# Patient Record
Sex: MALE | Race: WHITE | NOT HISPANIC OR LATINO | Employment: FULL TIME | URBAN - METROPOLITAN AREA
[De-identification: names, ages, dates, MRNs, and addresses within clinical notes are randomized per-mention and may not be internally consistent; named-entity substitution may affect disease eponyms.]

---

## 2018-01-26 ENCOUNTER — TRANSCRIBE ORDERS (OUTPATIENT)
Dept: ADMINISTRATIVE | Facility: HOSPITAL | Age: 28
End: 2018-01-26

## 2018-01-26 DIAGNOSIS — R94.5 NONSPECIFIC ABNORMAL RESULTS OF LIVER FUNCTION STUDY: Primary | ICD-10-CM

## 2018-01-31 ENCOUNTER — HOSPITAL ENCOUNTER (OUTPATIENT)
Dept: ULTRASOUND IMAGING | Facility: HOSPITAL | Age: 28
Discharge: HOME | End: 2018-01-31
Attending: SURGERY
Payer: COMMERCIAL

## 2018-01-31 DIAGNOSIS — R94.5 NONSPECIFIC ABNORMAL RESULTS OF LIVER FUNCTION STUDY: ICD-10-CM

## 2018-01-31 PROCEDURE — 76700 US EXAM ABDOM COMPLETE: CPT

## 2019-02-15 ENCOUNTER — HOSPITAL ENCOUNTER (EMERGENCY)
Facility: HOSPITAL | Age: 29
Discharge: HOME | End: 2019-02-15
Attending: EMERGENCY MEDICINE
Payer: COMMERCIAL

## 2019-02-15 VITALS
OXYGEN SATURATION: 95 % | DIASTOLIC BLOOD PRESSURE: 62 MMHG | SYSTOLIC BLOOD PRESSURE: 131 MMHG | WEIGHT: 315 LBS | HEART RATE: 107 BPM | TEMPERATURE: 102.4 F | RESPIRATION RATE: 20 BRPM

## 2019-02-15 DIAGNOSIS — M79.10 MYALGIA: ICD-10-CM

## 2019-02-15 DIAGNOSIS — R82.998 LEUKOCYTES IN URINE: ICD-10-CM

## 2019-02-15 DIAGNOSIS — J11.1 INFLUENZA-LIKE ILLNESS: Primary | ICD-10-CM

## 2019-02-15 LAB
BACTERIA UR QL AUTO: ABNORMAL /HPF
BILIRUB UR QL STRIP: NEGATIVE
CLARITY UR: ABNORMAL
COLOR UR: YELLOW
FLUAV AG SPEC QL IA: NEGATIVE
FLUBV AG SPEC QL IA: NEGATIVE
GLUCOSE UR STRIP-MCNC: NEGATIVE MG/DL
HGB UR QL STRIP.AUTO: ABNORMAL
KETONES UR STRIP-MCNC: NEGATIVE MG/DL
LEUKOCYTE ESTERASE UR QL STRIP: ABNORMAL
NITRITE UR QL STRIP: NEGATIVE
NON-SQ EPI CELLS URNS QL MICRO: ABNORMAL /HPF
PH UR STRIP.AUTO: 5.5 [PH] (ref 4.5–8)
PROT UR STRIP-MCNC: NEGATIVE MG/DL
RBC #/AREA URNS AUTO: ABNORMAL /HPF
SP GR UR STRIP.AUTO: 1.02 (ref 1–1.03)
UROBILINOGEN UR QL STRIP.AUTO: 0.2 E.U./DL
WBC #/AREA URNS AUTO: ABNORMAL /HPF

## 2019-02-15 PROCEDURE — 87086 URINE CULTURE/COLONY COUNT: CPT

## 2019-02-15 PROCEDURE — 99283 EMERGENCY DEPT VISIT LOW MDM: CPT

## 2019-02-15 PROCEDURE — 87591 N.GONORRHOEAE DNA AMP PROB: CPT | Performed by: EMERGENCY MEDICINE

## 2019-02-15 PROCEDURE — 81003 URINALYSIS AUTO W/O SCOPE: CPT

## 2019-02-15 PROCEDURE — 81001 URINALYSIS AUTO W/SCOPE: CPT

## 2019-02-15 PROCEDURE — 87491 CHLMYD TRACH DNA AMP PROBE: CPT | Performed by: EMERGENCY MEDICINE

## 2019-02-15 PROCEDURE — 87631 RESP VIRUS 3-5 TARGETS: CPT | Performed by: EMERGENCY MEDICINE

## 2019-02-15 RX ORDER — CIPROFLOXACIN 500 MG/1
500 TABLET, FILM COATED ORAL ONCE
Status: COMPLETED | OUTPATIENT
Start: 2019-02-15 | End: 2019-02-15

## 2019-02-15 RX ORDER — IBUPROFEN 400 MG/1
400 TABLET ORAL ONCE
Status: COMPLETED | OUTPATIENT
Start: 2019-02-15 | End: 2019-02-15

## 2019-02-15 RX ORDER — ACETAMINOPHEN 325 MG/1
325 TABLET ORAL ONCE
Status: COMPLETED | OUTPATIENT
Start: 2019-02-15 | End: 2019-02-15

## 2019-02-15 RX ORDER — AMLODIPINE BESYLATE AND ATORVASTATIN CALCIUM 10; 20 MG/1; MG/1
1 TABLET, FILM COATED ORAL DAILY
COMMUNITY

## 2019-02-15 RX ORDER — METOPROLOL SUCCINATE 25 MG/1
25 TABLET, EXTENDED RELEASE ORAL DAILY
COMMUNITY

## 2019-02-15 RX ORDER — ACETAMINOPHEN 325 MG/1
650 TABLET ORAL ONCE
Status: COMPLETED | OUTPATIENT
Start: 2019-02-15 | End: 2019-02-15

## 2019-02-15 RX ORDER — CIPROFLOXACIN 500 MG/1
500 TABLET, FILM COATED ORAL 2 TIMES DAILY
Qty: 14 TABLET | Refills: 0 | Status: SHIPPED | OUTPATIENT
Start: 2019-02-15 | End: 2019-02-22

## 2019-02-15 RX ADMIN — IBUPROFEN 400 MG: 400 TABLET ORAL at 17:23

## 2019-02-15 RX ADMIN — ACETAMINOPHEN 650 MG: 325 TABLET ORAL at 17:08

## 2019-02-15 RX ADMIN — IBUPROFEN 400 MG: 400 TABLET ORAL at 17:08

## 2019-02-15 RX ADMIN — CIPROFLOXACIN HYDROCHLORIDE 500 MG: 500 TABLET, FILM COATED ORAL at 19:31

## 2019-02-15 RX ADMIN — ACETAMINOPHEN 325 MG: 325 TABLET ORAL at 17:23

## 2019-02-15 NOTE — ED PROVIDER NOTES
History  Chief Complaint   Patient presents with    Flu Symptoms     C/o muscle aches, fever, weakness, decreased, chills appetite starting Tuesday  Got flu shot this year  Patient is a 69-year-old male who presents to the emergency department having felt increasingly poorly over the last few days  He relates that on Monday February 10th he felt fine    He relates that on Tuesday he felt fine until 1900 when he went to shower  He stood up and suddenly noted that he was shivering feeling chilled  He relates that this is very unusual for him  He did take ibuprofen and slept poorly overnight  He relates that on Wednesday he developed some pain in the back of the head  He relates that it was not bad    This was reasonably well controlled with ibuprofen and he rested for the day  He relates that he was able to go to work on Thursday and took ibuprofen multiple times  Today he noted decreased appetite, slight increase in the pain at the base of the head and neck as well as intense myalgias this evening  Fever was 103 2 prior to coming into the ED  He denies having any significant nasal congestion or sore throat  He denies any problems with breathing  He has noted slight discomfort upon both defecation and urination with a slight amount of dysuria  Past medical history significant for asthma and hypertension  Three weeks ago he and the rest of his family members additionally had significant the episode of gastroenteritis  Last dose of ibuprofen was yesterday  Prior to Admission Medications   Prescriptions Last Dose Informant Patient Reported? Taking?    amLODIPine-atorvastatin (CADUET) 10-20 MG per tablet   Yes Yes   Sig: Take 1 tablet by mouth daily   metoprolol succinate (TOPROL-XL) 25 mg 24 hr tablet   Yes Yes   Sig: Take 25 mg by mouth daily      Facility-Administered Medications: None       Past Medical History:   Diagnosis Date    Asthma     Hypertension        Past Surgical History:   Procedure Laterality Date    CHOLECYSTECTOMY         History reviewed  No pertinent family history  I have reviewed and agree with the history as documented  Social History     Tobacco Use    Smoking status: Never Smoker    Smokeless tobacco: Never Used   Substance Use Topics    Alcohol use: Yes     Frequency: Never     Comment: social    Drug use: Never        Review of Systems   All other systems reviewed and are negative  Physical Exam  Physical Exam   Constitutional: He is oriented to person, place, and time  He appears well-developed and well-nourished  HENT:   Head: Normocephalic  Right Ear: Tympanic membrane and ear canal normal    Left Ear: Tympanic membrane and ear canal normal    Nose: Mucosal edema present  Mouth/Throat: Uvula is midline, oropharynx is clear and moist and mucous membranes are normal    Eyes: Conjunctivae and EOM are normal    Neck:   No meningismus  Mild tenderness at the base of the neck bilaterally  Cardiovascular: Regular rhythm  Tachycardic   Pulmonary/Chest: Effort normal and breath sounds normal    Abdominal: Soft  He exhibits no distension  There is no tenderness  Musculoskeletal: Normal range of motion  Lymphadenopathy:     He has no cervical adenopathy  Neurological: He is alert and oriented to person, place, and time  Skin: Skin is warm and dry  Psychiatric: He has a normal mood and affect  His behavior is normal    Nursing note and vitals reviewed        Vital Signs  ED Triage Vitals [02/15/19 1629]   Temperature Pulse Respirations Blood Pressure SpO2   (!) 102 4 °F (39 1 °C) (!) 120 18 147/73 100 %      Temp Source Heart Rate Source Patient Position - Orthostatic VS BP Location FiO2 (%)   Oral Monitor Sitting Left arm --      Pain Score       4           Vitals:    02/15/19 1629 02/15/19 1915   BP: 147/73 131/62   Pulse: (!) 120 (!) 107   Patient Position - Orthostatic VS: Sitting Lying       Visual Acuity      ED Medications  Medications   acetaminophen (TYLENOL) tablet 650 mg (650 mg Oral Given 2/15/19 1708)   ibuprofen (MOTRIN) tablet 400 mg (400 mg Oral Given 2/15/19 1708)   ibuprofen (MOTRIN) tablet 400 mg (400 mg Oral Given 2/15/19 1723)   acetaminophen (TYLENOL) tablet 325 mg (325 mg Oral Given 2/15/19 1723)   ciprofloxacin (CIPRO) tablet 500 mg (500 mg Oral Given 2/15/19 1931)       Diagnostic Studies  Results Reviewed     Procedure Component Value Units Date/Time    Urine Microscopic [[id number]]  (Abnormal) Collected:  02/15/19 1740    Lab Status:  Final result Specimen:  Urine, Clean Catch Updated:  02/15/19 1828     RBC, UA None Seen /hpf      WBC, UA Innumerable /hpf      Epithelial Cells Occasional /hpf      Bacteria, UA Occasional /hpf     Urine culture [[id number]] Collected:  02/15/19 1740    Lab Status: In process Specimen:  Urine, Clean Catch Updated:  02/15/19 1828    Rapid Influenza Screen with Reflex PCR [[id number]]  (Normal) Collected:  02/15/19 1725    Lab Status:  Final result Specimen:  Nasopharyngeal Swab Updated:  02/15/19 1823     Rapid Influenza A Ag Negative     Rapid Influenza B Ag Negative    INFLUENZA A/B AND RSV, PCR [[id number]] Collected:  02/15/19 1725    Lab Status: In process Specimen:  Nasopharyngeal Swab Updated:  02/15/19 1823    Chlamydia/GC amplified DNA by PCR [[id number]] Collected:  02/15/19 1740    Lab Status:   In process Specimen:  Urine, Other Updated:  02/15/19 1806    ED Urine Macroscopic [[id number]]  (Abnormal) Collected:  02/15/19 1740    Lab Status:  Final result Specimen:  Urine Updated:  02/15/19 1738     Color, UA Yellow     Clarity, UA Cloudy     pH, UA 5 5     Leukocytes, UA Moderate     Nitrite, UA Negative     Protein, UA Negative mg/dl      Glucose, UA Negative mg/dl      Ketones, UA Negative mg/dl      Urobilinogen, UA 0 2 E U /dl      Bilirubin, UA Negative     Blood, UA Small     Specific [place], UA 1 025    Narrative:       CLINITEK RESULT                 No orders to display              Procedures  Procedures       Phone Contacts  ED Phone Contact    ED Course  ED Course as of Feb 16 0030   Fri Feb 15, 2019   1727 Symptoms most consistent with influenza  Patient did have a flu vaccine this year  He is at the borderline time for considering use of Tamiflu  Given history of asthma he would otherwise be an appropriate candidate  Will check rapid influenza  Treating with a high-dose acetaminophen and NSAID for symptomatic relief  Additionally checking urine sample given slight dysuria though suspicion for UTI is less likely  0 Study results reviewed with patient  Rapid influenza testing is negative  Symptoms otherwise consistent with viral etiology  With regard to white blood cells in the urine explained that this was an unusual finding an otherwise healthy male  He is circumcised and denies having appreciated any rash or penile discharge  No history of UTIs  He has no concern for STI exposure  At this time treating as UTI  He is aware that the urine culture will return within the next couple of days  Work note will be provided  MDM    Disposition  Final diagnoses:   Influenza-like illness   Myalgia   Leukocytes in urine     Time reflects when diagnosis was documented in both MDM as applicable and the Disposition within this note     Time User Action Codes Description Comment    2/15/2019  7:14 PM Dayday Jovany A Add [R69] Influenza-like illness     2/15/2019  7:14 PM Dayday Jovany A Add [M79 10] Myalgia     2/15/2019  7:15 PM Dayday Jovany A Add [R82 998] Leukocytes in urine       ED Disposition     ED Disposition Condition Date/Time Comment    Discharge Stable Fri Feb 15, 2019  7:14 PM Juliana Lunggarima discharge to home/self care              Follow-up Information     Follow up With Specialties Details Why Contact Susy Choi DO Family Medicine Schedule an appointment as soon as possible for a visit   [address] [place], [place] [id number]  [phone]            Discharge Medication List as of 2/15/2019  7:18 PM      START taking these medications    Details   ciprofloxacin (CIPRO) 500 mg tablet Take 1 tablet (500 mg total) by mouth 2 (two) times a day for 7 days, Starting Fri 2/15/2019, Until Fri 2/22/2019, Print         CONTINUE these medications which have NOT CHANGED    Details   amLODIPine-atorvastatin (CADUET) 10-20 MG per tablet Take 1 tablet by mouth daily, Historical Med      metoprolol succinate (TOPROL-XL) 25 mg 24 hr tablet Take 25 mg by mouth daily, Historical Med           No discharge procedures on file      ED Provider  Electronically Signed by           Nik Lopez MD  02/16/19 2550

## 2019-02-16 LAB
BACTERIA UR CULT: NORMAL
FLUAV AG SPEC QL: NOT DETECTED
FLUBV AG SPEC QL: NOT DETECTED
RSV B RNA SPEC QL NAA+PROBE: NOT DETECTED

## 2019-02-16 NOTE — DISCHARGE INSTRUCTIONS
Take ciprofloxacin 500 mg orally twice daily for 7 days  Be sure to stay well hydrated  You may take acetaminophen 975 or 1000 mg every 6 hr as well as ibuprofen 800 mg every 6 hr as needed for discomfort  As discussed, urine culture has been sent  Youbaudilio receive a phone call if a change in antibiotic is needed  Return to the emergency department for any worsening/new concerns

## 2019-02-17 LAB
C TRACH DNA SPEC QL NAA+PROBE: NEGATIVE
N GONORRHOEA DNA SPEC QL NAA+PROBE: NEGATIVE

## 2021-04-08 DIAGNOSIS — Z23 ENCOUNTER FOR IMMUNIZATION: ICD-10-CM

## 2024-02-09 ENCOUNTER — APPOINTMENT (OUTPATIENT)
Dept: LAB | Facility: CLINIC | Age: 34
End: 2024-02-09
Payer: COMMERCIAL

## 2024-02-09 ENCOUNTER — APPOINTMENT (OUTPATIENT)
Dept: RADIOLOGY | Facility: CLINIC | Age: 34
End: 2024-02-09
Payer: COMMERCIAL

## 2024-02-09 ENCOUNTER — TRANSCRIBE ORDERS (OUTPATIENT)
Dept: URGENT CARE | Facility: CLINIC | Age: 34
End: 2024-02-09

## 2024-02-09 DIAGNOSIS — M79.639 PAIN IN FOREARM, UNSPECIFIED LATERALITY: ICD-10-CM

## 2024-02-09 DIAGNOSIS — M25.532 ARTHRALGIA OF LEFT FOREARM: Primary | ICD-10-CM

## 2024-02-09 DIAGNOSIS — M25.532 ARTHRALGIA OF LEFT FOREARM: ICD-10-CM

## 2024-02-09 DIAGNOSIS — M25.539 ARTHRALGIA OF FOREARM, UNSPECIFIED LATERALITY: ICD-10-CM

## 2024-02-09 LAB
ANA SER QL IA: NEGATIVE
ERYTHROCYTE [SEDIMENTATION RATE] IN BLOOD: 14 MM/HOUR (ref 0–14)
RHEUMATOID FACT SER QL LA: NEGATIVE
URATE SERPL-MCNC: 5.7 MG/DL (ref 3.5–8.5)

## 2024-02-09 PROCEDURE — 73110 X-RAY EXAM OF WRIST: CPT

## 2024-02-09 PROCEDURE — 73090 X-RAY EXAM OF FOREARM: CPT

## 2024-02-09 PROCEDURE — 86038 ANTINUCLEAR ANTIBODIES: CPT

## 2024-02-09 PROCEDURE — 86430 RHEUMATOID FACTOR TEST QUAL: CPT

## 2024-02-09 PROCEDURE — 85652 RBC SED RATE AUTOMATED: CPT

## 2024-02-09 PROCEDURE — 84550 ASSAY OF BLOOD/URIC ACID: CPT

## 2024-02-09 PROCEDURE — 36415 COLL VENOUS BLD VENIPUNCTURE: CPT

## 2024-04-17 ENCOUNTER — OCCMED (OUTPATIENT)
Dept: URGENT CARE | Facility: CLINIC | Age: 34
End: 2024-04-17

## 2024-04-17 DIAGNOSIS — S61.214A LACERATION WITHOUT FOREIGN BODY OF RIGHT RING FINGER WITHOUT DAMAGE TO NAIL, INITIAL ENCOUNTER: Primary | ICD-10-CM

## 2024-05-01 ENCOUNTER — OFFICE VISIT (OUTPATIENT)
Dept: URGENT CARE | Facility: CLINIC | Age: 34
End: 2024-05-01

## 2024-05-01 DIAGNOSIS — S61.314D: Primary | ICD-10-CM

## 2024-05-02 ENCOUNTER — HOSPITAL ENCOUNTER (OUTPATIENT)
Dept: RADIOLOGY | Facility: HOSPITAL | Age: 34
Discharge: HOME | End: 2024-05-02
Attending: STUDENT IN AN ORGANIZED HEALTH CARE EDUCATION/TRAINING PROGRAM
Payer: OTHER MISCELLANEOUS

## 2024-05-02 VITALS — HEIGHT: 73 IN | WEIGHT: 271.4 LBS | BODY MASS INDEX: 35.97 KG/M2

## 2024-05-02 DIAGNOSIS — M79.644 FINGER PAIN, RIGHT: ICD-10-CM
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
## 2024-05-02 PROCEDURE — 99204 OFFICE O/P NEW MOD 45 MIN: CPT | Performed by: STUDENT IN AN ORGANIZED HEALTH CARE EDUCATION/TRAINING PROGRAM

## 2024-05-02 PROCEDURE — 73140 X-RAY EXAM OF FINGER(S): CPT

## 2024-05-02 RX ORDER — MELOXICAM 7.5 MG/1
7.5 TABLET ORAL DAILY
Qty: 14 TABLET | Refills: 0 | Status: SHIPPED | OUTPATIENT
Start: 2024-05-02

## 2024-05-02 NOTE — PROGRESS NOTES
ORTHOPAEDIC HAND, WRIST, AND ELBOW OFFICE  VISIT      ASSESSMENT/PLAN:      Diagnoses and all orders for this visit:    Cat bite, initial encounter  -     meloxicam (Mobic) 7.5 mg tablet; Take 1 tablet (7.5 mg total) by mouth daily    Finger pain, right  -     XR finger right fourth digit-ring; Future    Other orders  -     metoprolol tartrate (LOPRESSOR) 25 mg tablet; Take 25 mg by mouth 2 (two) times a day          33 y.o. male with right ring finger cat bite    X-rays were reviewed which demonstrate no fractures or dislocations. On exam, tendons are intact. There is no erythema or signs of infection. He was instructed to wash the hands with soap and water. He will continue with light duty work and a updated note was provided for this today. A 2 week prescription was provided for Mobic. He will follow up in 4 weeks for repeat evaluation.    Follow Up:  4 weeks       To Do Next Visit:  Re-evaluation of current issue          Antwon Dickson MD  Attending, Orthopaedic Surgery  Hand, Wrist, and Elbow Surgery  [facility]    ______________________________________________________________________________________________    CHIEF COMPLAINT:  Chief Complaint   Patient presents with   • Right Ring Finger - Pain       SUBJECTIVE:  Patient is a 33 y.o. RHD male who presents today for evaluation and treatment of right ring finger cat bite. The patient states appx 2 weeks ago he was at work when a cat bit his right ring finger near the PIP joint. He presented to urgent care after the injury were the bite was washed out and steri-strips were applied. He was placed on Augmentin which he completed as prescribed. He notes some pain and tightness with finger flexion. He denies any drainage. He denies any fever or chills.     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 No pertinent family history.    SOCIAL HISTORY:  Social History     Tobacco Use   • Smoking status: Never   • Smokeless tobacco: Never   Substance Use Topics   • Alcohol use: Yes     Comment: social   • Drug use: Never       MEDICATIONS:    Current Outpatient Medications:   •  amLODIPine-atorvastatin (CADUET) 10-20 MG per tablet, Take 1 tablet by mouth daily, Disp: , Rfl:   •  meloxicam (Mobic) 7.5 mg tablet, Take 1 tablet (7.5 mg total) by mouth daily, Disp: 14 tablet, Rfl: 0  •  metoprolol succinate (TOPROL-XL) 25 mg 24 hr tablet, Take 25 mg by mouth daily, Disp: , Rfl:   •  metoprolol tartrate (LOPRESSOR) 25 mg tablet, Take 25 mg by mouth 2 (two) times a day, Disp: , Rfl:     ALLERGIES:  Allergies   Allergen Reactions   • Red Dye - Food Allergy Hives           REVIEW OF SYSTEMS:  Musculoskeletal:        As noted in HPI.   All other systems reviewed and are negative.    VITALS:  There were no vitals filed for this visit.    LABS:  HgA1c: No results found for: \"HGBA1C\"  BMP: No results found for: \"GLUCOSE\", \"CALCIUM\", \"NA\", \"K\", \"CO2\", \"CL\", \"BUN\", \"CREATININE\"    _____________________________________________________  PHYSICAL EXAMINATION:  General: Well developed and well nourished, alert & oriented x 3, appears comfortable  Psychiatric: Normal  HEENT: Normocephalic, Atraumatic Trachea Midline, No torticollis  Pulmonary: No audible wheezing or respiratory distress   Abdomen/GI: Non tender, non distended   Cardiovascular: No pitting edema, 2+ radial pulse   Skin: No Masses, No Fluctuation, No Ulcerations  Neurovascular: Sensation Intact to the Median, Ulnar, Radial Nerve, Motor Intact to the Median, Ulnar, Radial Nerve, and Pulses Intact  Musculoskeletal: Normal, except as noted in detailed exam and in " HPI.      MUSCULOSKELETAL EXAMINATION:  Right ring  Laceration 1 cm oblique at level of ring finger PIP joint  Pulp to palm 5 mm  5/5 PIP extension strength   - Tripp test  ___________________________________________________  STUDIES REVIEWED:  Xrays of the right ring finger were reviewed and independently interpreted in PACS by Dr. Dickson and demonstrate no fractures or dislocations.           PROCEDURES PERFORMED:  Procedures  No Procedures performed today    _____________________________________________________      Scribe Attestation    I,:  Tori Arceo MA am acting as a scribe while in the presence of the attending physician.:       I,:  Antwon Dickson MD personally performed the services described in this documentation    as scribed in my presence.:

## 2024-05-02 NOTE — LETTER
May 2, 2024     Patient: Hernán Balderas  YOB: 1990  Date of Visit: 5/2/2024      To Whom it May Concern:    Hernán Balderas is under my professional care. Hernán was seen in my office on 5/2/2024. Hernán may return to work on light duty no lifting greater than 2 lbs with his RUE.    If you have any questions or concerns, please don't hesitate to call.         Sincerely,          Antwon Dickson MD

## 2024-05-31 ENCOUNTER — OFFICE VISIT (OUTPATIENT)
Dept: OBGYN CLINIC | Facility: CLINIC | Age: 34
End: 2024-05-31
Payer: OTHER MISCELLANEOUS

## 2024-05-31 VITALS
HEIGHT: 73 IN | WEIGHT: 277 LBS | SYSTOLIC BLOOD PRESSURE: 154 MMHG | DIASTOLIC BLOOD PRESSURE: 98 MMHG | BODY MASS INDEX: 36.71 KG/M2
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
## 2024-05-31 PROCEDURE — 99213 OFFICE O/P EST LOW 20 MIN: CPT | Performed by: STUDENT IN AN ORGANIZED HEALTH CARE EDUCATION/TRAINING PROGRAM

## 2024-05-31 RX ORDER — AMLODIPINE BESYLATE AND BENAZEPRIL HYDROCHLORIDE 5; 10 MG/1; MG/1
1 CAPSULE ORAL DAILY
COMMUNITY
Start: 2024-04-27

## 2024-05-31 NOTE — PROGRESS NOTES
ORTHOPAEDIC HAND, WRIST, AND ELBOW OFFICE  VISIT      ASSESSMENT/PLAN:      Diagnoses and all orders for this visit:    Cat bite, subsequent encounter    Other orders  -     amLODIPine-benazepril (LOTREL 5-10) 5-10 MG per capsule; Take 1 capsule by mouth daily          34 y.o. male with right ring finger cat bite     The patient is doing well. The scar is well healed. The patient was instructed to work on scar massage. He has full range of motion on exam. He may return to work full duty and a note was provided for this today. He may follow up with me as needed.      Follow Up:  PRN       To Do Next Visit:             Antwon Dickson MD  Attending, Orthopaedic Surgery  Hand, Wrist, and Elbow Surgery  [facility]    ______________________________________________________________________________________________    CHIEF COMPLAINT:  Chief Complaint   Patient presents with   • Right Ring Finger - Follow-up     Feels tight        SUBJECTIVE:  Patient is a 34 y.o. RHD male who presents today for follow up of right ring finger cat bite.  The patient states he is doing well. He notes some tightness over the scar with flexion. He notes he can make a fist. He denies any pain.      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 No pertinent family history.    SOCIAL HISTORY:  Social History     Tobacco Use   • Smoking status: Never   • Smokeless tobacco: Never   Substance Use Topics   • Alcohol use: Yes     Comment: social   • Drug use: Never       MEDICATIONS:    Current Outpatient Medications:   •  amLODIPine-benazepril (LOTREL 5-10) 5-10 MG per capsule, Take 1 capsule  "by mouth daily, Disp: , Rfl:   •  meloxicam (Mobic) 7.5 mg tablet, Take 1 tablet (7.5 mg total) by mouth daily, Disp: 14 tablet, Rfl: 0  •  metoprolol tartrate (LOPRESSOR) 25 mg tablet, Take 25 mg by mouth 2 (two) times a day, Disp: , Rfl:   •  metoprolol succinate (TOPROL-XL) 25 mg 24 hr tablet, Take 25 mg by mouth daily (Patient not taking: Reported on 5/31/2024), Disp: , Rfl:     ALLERGIES:  Allergies   Allergen Reactions   • Red Dye - Food Allergy Hives           REVIEW OF SYSTEMS:  Musculoskeletal:        As noted in HPI.   All other systems reviewed and are negative.    VITALS:  Vitals:    05/31/24 1007   BP: 154/98       LABS:  HgA1c: No results found for: \"HGBA1C\"  BMP: No results found for: \"GLUCOSE\", \"CALCIUM\", \"NA\", \"K\", \"CO2\", \"CL\", \"BUN\", \"CREATININE\"    _____________________________________________________  PHYSICAL EXAMINATION:  General: Well developed and well nourished, alert & oriented x 3, appears comfortable  Psychiatric: Normal  HEENT: Normocephalic, Atraumatic Trachea Midline, No torticollis  Pulmonary: No audible wheezing or respiratory distress   Abdomen/GI: Non tender, non distended   Cardiovascular: No pitting edema, 2+ radial pulse   Skin: No masses, erythema, lacerations, fluctation, ulcerations  Neurovascular: Sensation Intact to the Median, Ulnar, Radial Nerve, Motor Intact to the Median, Ulnar, Radial Nerve, and Pulses Intact  Musculoskeletal: Normal, except as noted in detailed exam and in HPI.      MUSCULOSKELETAL EXAMINATION:  Right right finger  Scar well healed  Full digit extension  Full fist    ___________________________________________________  STUDIES REVIEWED:  Prior ESR from 2/9/24 reviewed  Prior right ring finger x-rays reviewed        PROCEDURES PERFORMED:  Procedures  No Procedures performed today    _____________________________________________________      Scribe Attestation    I,:  Tori Arceo MA am acting as a scribe while in the presence of the attending " physician.:       I,:  Antwon Dickson MD personally performed the services described in this documentation    as scribed in my presence.:

## 2024-05-31 NOTE — LETTER
May 31, 2024     Patient: Hernán Balderas  YOB: 1990  Date of Visit: 5/31/2024      To Whom it May Concern:    Hernán Balderas is under my professional care. Hernán was seen in my office on 5/31/2024. Hernán may return to work full duty with no restrictions.     If you have any questions or concerns, please don't hesitate to call.         Sincerely,          Antwon Dickson MD

## 2025-02-11 ENCOUNTER — HOSPITAL ENCOUNTER (EMERGENCY)
Facility: HOSPITAL | Age: 35
Discharge: HOME | End: 2025-02-11
Attending: EMERGENCY MEDICINE | Admitting: EMERGENCY MEDICINE
Payer: COMMERCIAL

## 2025-02-11 ENCOUNTER — APPOINTMENT (EMERGENCY)
Dept: CT IMAGING | Facility: HOSPITAL | Age: 35
End: 2025-02-11
Payer: COMMERCIAL

## 2025-02-11 VITALS
RESPIRATION RATE: 18 BRPM | OXYGEN SATURATION: 97 % | TEMPERATURE: 98.8 F | HEART RATE: 71 BPM | SYSTOLIC BLOOD PRESSURE: 164 MMHG | DIASTOLIC BLOOD PRESSURE: 93 MMHG

## 2025-02-11 DIAGNOSIS — R10.9 ABDOMINAL PAIN: Primary | ICD-10-CM

## 2025-02-11 LAB
ALBUMIN SERPL BCG-MCNC: 4.4 G/DL (ref 3.5–5)
ALP SERPL-CCNC: 106 U/L (ref 34–104)
ALT SERPL W P-5'-P-CCNC: 24 U/L (ref 7–52)
ANION GAP SERPL CALCULATED.3IONS-SCNC: 7 MMOL/L (ref 4–13)
AST SERPL W P-5'-P-CCNC: 22 U/L (ref 13–39)
BASOPHILS # BLD MANUAL: 0.49 THOUSAND/UL (ref 0–0.1)
BASOPHILS NFR MAR MANUAL: 3 % (ref 0–1)
BILIRUB SERPL-MCNC: 0.82 MG/DL (ref 0.2–1)
BILIRUB UR QL STRIP: NEGATIVE
BUN SERPL-MCNC: 15 MG/DL (ref 5–25)
CALCIUM SERPL-MCNC: 9.7 MG/DL (ref 8.4–10.2)
CHLORIDE SERPL-SCNC: 108 MMOL/L (ref 96–108)
CLARITY UR: CLEAR
CO2 SERPL-SCNC: 27 MMOL/L (ref 21–32)
COLOR UR: YELLOW
CREAT SERPL-MCNC: 0.8 MG/DL (ref 0.6–1.3)
EOSINOPHIL # BLD MANUAL: 3.41 THOUSAND/UL (ref 0–0.4)
EOSINOPHIL NFR BLD MANUAL: 21 % (ref 0–6)
ERYTHROCYTE [DISTWIDTH] IN BLOOD BY AUTOMATED COUNT: 12.7 % (ref 11.6–15.1)
GFR SERPL CREATININE-BSD FRML MDRD: 115 ML/MIN/1.73SQ M
GLUCOSE SERPL-MCNC: 105 MG/DL (ref 65–140)
GLUCOSE UR STRIP-MCNC: NEGATIVE MG/DL
HCT VFR BLD AUTO: 48 % (ref 36.5–49.3)
HGB BLD-MCNC: 15.9 G/DL (ref 12–17)
HGB UR QL STRIP.AUTO: NEGATIVE
KETONES UR STRIP-MCNC: NEGATIVE MG/DL
LEUKOCYTE ESTERASE UR QL STRIP: NEGATIVE
LG PLATELETS BLD QL SMEAR: PRESENT
LIPASE SERPL-CCNC: 43 U/L (ref 11–82)
LYMPHOCYTES # BLD AUTO: 21 % (ref 14–44)
LYMPHOCYTES # BLD AUTO: 3.89 THOUSAND/UL (ref 0.6–4.47)
MCH RBC QN AUTO: 29.2 PG (ref 26.8–34.3)
MCHC RBC AUTO-ENTMCNC: 33.1 G/DL (ref 31.4–37.4)
MCV RBC AUTO: 88 FL (ref 82–98)
MONOCYTES # BLD AUTO: 0.65 THOUSAND/UL (ref 0–1.22)
MONOCYTES NFR BLD: 4 % (ref 4–12)
NEUTROPHILS # BLD MANUAL: 7.79 THOUSAND/UL (ref 1.85–7.62)
NEUTS BAND NFR BLD MANUAL: 3 % (ref 0–8)
NEUTS SEG NFR BLD AUTO: 45 % (ref 43–75)
NITRITE UR QL STRIP: NEGATIVE
PH UR STRIP.AUTO: 5.5 [PH]
PLATELET # BLD AUTO: 315 THOUSANDS/UL (ref 149–390)
PLATELET BLD QL SMEAR: ADEQUATE
PMV BLD AUTO: 10.5 FL (ref 8.9–12.7)
POTASSIUM SERPL-SCNC: 3.9 MMOL/L (ref 3.5–5.3)
PROT SERPL-MCNC: 7.5 G/DL (ref 6.4–8.4)
PROT UR STRIP-MCNC: NEGATIVE MG/DL
RBC # BLD AUTO: 5.45 MILLION/UL (ref 3.88–5.62)
RBC MORPH BLD: PRESENT
SODIUM SERPL-SCNC: 142 MMOL/L (ref 135–147)
SP GR UR STRIP.AUTO: 1.02 (ref 1–1.03)
UROBILINOGEN UR STRIP-ACNC: <2 MG/DL
VARIANT LYMPHS # BLD AUTO: 3 %
WBC # BLD AUTO: 16.22 THOUSAND/UL (ref 4.31–10.16)

## 2025-02-11 PROCEDURE — 85007 BL SMEAR W/DIFF WBC COUNT: CPT

## 2025-02-11 PROCEDURE — 99285 EMERGENCY DEPT VISIT HI MDM: CPT | Performed by: EMERGENCY MEDICINE

## 2025-02-11 PROCEDURE — 83690 ASSAY OF LIPASE: CPT

## 2025-02-11 PROCEDURE — 74177 CT ABD & PELVIS W/CONTRAST: CPT

## 2025-02-11 PROCEDURE — 81003 URINALYSIS AUTO W/O SCOPE: CPT | Performed by: EMERGENCY MEDICINE

## 2025-02-11 PROCEDURE — 36415 COLL VENOUS BLD VENIPUNCTURE: CPT

## 2025-02-11 PROCEDURE — 85027 COMPLETE CBC AUTOMATED: CPT

## 2025-02-11 PROCEDURE — 80053 COMPREHEN METABOLIC PANEL: CPT

## 2025-02-11 PROCEDURE — 99284 EMERGENCY DEPT VISIT MOD MDM: CPT

## 2025-02-11 RX ADMIN — IOHEXOL 100 ML: 350 INJECTION, SOLUTION INTRAVENOUS at 18:35

## 2025-02-11 NOTE — ED ATTENDING ATTESTATION
2/11/2025  I, Mary Ann Tirado MD, saw and evaluated the patient. I have discussed the patient with the resident/non-physician practitioner and agree with the resident's/non-physician practitioner's findings, Plan of Care, and MDM as documented in the resident's/non-physician practitioner's note, except where noted. All available labs and Radiology studies were reviewed.  I was present for key portions of any procedure(s) performed by the resident/non-physician practitioner and I was immediately available to provide assistance.       At this point I agree with the current assessment done in the Emergency Department.  I have conducted an independent evaluation of this patient a history and physical is as follows:    Patient is a 34-year-old male healthy with abdominal surgical history of remote cholecystectomy who presents to the emergency department for evaluation with right-sided abdominal pain.  On Friday, 4 days ago, he developed 1 very intense episode with a squeezing sensation in the right mid abdomen and intense nausea.  This lasted for several minutes and he thought he might vomit.  Discomfort resolved without intervention.  He had additionally similar episode on Sunday (2 days ago) which ended with nonbloody nonbilious emesis.  He additionally had 1 loose nonbloody stool that day.  He has had recurrent episodes of intermittent squeezing sensation in that same location.  Constantly feels a bit of tension at the site.  He has been eating a bit less with consideration of not wanting to aggravate the pain although has not noticed any correlation to ingestion, movement or any other activities.  No fevers.  No URI symptoms.  He has since had normal stool passage without change in pain.  Urination has remained unremarkable without dysuria or gross hematuria.  Symptoms slightly reminiscent to that prior to cholecystectomy although he describes pain at that time as having been more of a stabbing  sensation.  Discomfort is minimal presently.  He has not taken any analgesic and declines such here.    On exam he appears well.  Mucous membranes are moist.  No pallor nor icterus.  Heart sounds regular and lungs clear to auscultation bilaterally.  No CVA tenderness.  Abdomen is soft with normoactive bowel sounds.  He does have tenderness in the right upper quadrant and right mid abdomen without guarding.  Remainder of abdomen is nontender.    Labs reveal leukocytosis with white count of 16,000.  No left shift.    Differential diagnosis includes but is not limited to viral gastroenteritis, foodborne enteritis, gastritis, gastric or duodenal ulcer, diverticulitis, colitis, mesenteric panniculitis, ureterolithiasis, mesenteric adenitis, epiploic appendagitis, appendicitis, choledocholithiasis, hepatitis, muscle strain/spasm, lumbar radiculopathy.    Overall well-appearing.  He does have significant leukocytosis.  Obtaining CT imaging and UA in consideration of possible bacterial etiology requiring antibiotic therapy or condition requiring procedural intervention.    ED Course         Critical Care Time  Procedures

## 2025-02-12 NOTE — DISCHARGE INSTRUCTIONS
You were seen in the Emergency Department for: abdominal pain    Your next steps should include: Follow-up with your primary care doctor in the next 3 to 5 days    Reasons to RETURN IMMEDIATELY to the Emergency Department: You develop worsening abdominal pain, you begin to see blood in your stool or vomit, pain is uncontrolled with over-the-counter medications or develop any new or worsening symptoms that concern you

## 2025-02-12 NOTE — ED PROVIDER NOTES
Time reflects when diagnosis was documented in both MDM as applicable and the Disposition within this note       Time User Action Codes Description Comment    2/11/2025  7:16 PM Jian Corazon Add [R10.9] Abdominal pain           ED Disposition       ED Disposition   Discharge    Condition   Stable    Date/Time   Tue Feb 11, 2025  7:16 PM    Comment   Hernán Balderas discharge to home/self care.                   Assessment & Plan       Medical Decision Making  Amount and/or Complexity of Data Reviewed  Labs:  Decision-making details documented in ED Course.  Radiology: ordered.    Risk  Prescription drug management.      Hernán Balderas is a 34 year old male PMH cholecystectomy presenting to the ED for 2 day history of abdominal pain.  Differential includes nephrolithiasis, gastritis, UTI, gastroenteritis.  CBC with leukocytosis to 16.  CMP unremarkable.  UA without signs of infection.  Obtained CT abdomen pelvis since patient was having pain in 1 specific spot that had not migrated all.  CT abdomen pelvis did not show any acute abnormalities and also showed normal appendix.  During the stay, patient declined pain medication multiple times stating that he is comfortable.   Told patient to follow-up with primary care doctor in the next few days to ensure that pain is resolving.    Dispo: discharge to home  Prescriptions: None  Other: Follow-up with primary care doctor    Discussed results and plan with patient. Patient was agreeable and expressed understanding. Discussed return precautions.    ED Course as of 02/12/25 0115   Tue Feb 11, 2025   1718 UA w Reflex to Microscopic w Reflex to Culture  Negative       Medications   iohexol (OMNIPAQUE) 350 MG/ML injection (MULTI-DOSE) 100 mL (100 mL Intravenous Given 2/11/25 1835)       ED Risk Strat Scores                    History of Present Illness       Chief Complaint   Patient presents with    Abdominal Pain     Pt reports onset of right sided abd pain Wednesday, Sunday,  and today       Past Medical History:   Diagnosis Date    Asthma     Hypertension       Past Surgical History:   Procedure Laterality Date    CHOLECYSTECTOMY        History reviewed. No pertinent family history.   Social History     Tobacco Use    Smoking status: Never    Smokeless tobacco: Never   Substance Use Topics    Alcohol use: Yes     Comment: social    Drug use: Never      E-Cigarette/Vaping      E-Cigarette/Vaping Substances      I have reviewed and agree with the history as documented.       Abdominal Pain  Associated symptoms: no chills, no constipation, no cough, no dysuria, no fatigue, no fever, no hematuria and no shortness of breath      Hernán Balderas is a 34 year old male PMH cholecystectomy presenting to the ED for 2 day history of abdominal pain.  Pain began Sunday night with right-sided dull abdominal pain that progressed to 8 out of 10 pain.  He began to feel nauseous followed by 1 episode of emesis.  Following episode of emesis abdominal pain and nausea improved.  The next day on Monday, patient woke up with constant dull right-sided abdominal pain in between the right upper and lower quadrant.  Pain is constant 3 out of 10 and intermittently worsens for 10 to 15 minutes a few times during the day.  No worsening pain feels like tightening pressure.  Has not had any further episodes of nausea or emesis, but wanted to be evaluated in the ED since symptoms progressed into this morning.  He denies any chest pain, shortness of breath, fevers.  States he had 1 episode of diarrhea on Sunday night but has not had another since.  States he does not have any urinary symptoms or changes in his stool.  Denies any blood in emesis or stool.  Review of Systems   Constitutional:  Negative for chills, fatigue and fever.   HENT:  Negative for congestion and rhinorrhea.    Respiratory:  Negative for cough and shortness of breath.    Gastrointestinal:  Positive for abdominal pain. Negative for constipation.    Genitourinary:  Negative for dysuria and hematuria.   Skin:  Negative for rash and wound.   Neurological:  Negative for dizziness, light-headedness and headaches.           Objective       ED Triage Vitals   Temperature Pulse Blood Pressure Respirations SpO2 Patient Position - Orthostatic VS   02/11/25 1130 02/11/25 1131 02/11/25 1131 02/11/25 1131 02/11/25 1131 --   98.8 °F (37.1 °C) 71 164/93 18 97 %       Temp Source Heart Rate Source BP Location FiO2 (%) Pain Score    02/11/25 1130 02/11/25 1131 -- -- --    Oral Monitor         Vitals      Date and Time Temp Pulse SpO2 Resp BP Pain Score FACES Pain Rating User   02/11/25 1131 -- 71 97 % 18 164/93 -- -- AB   02/11/25 1130 98.8 °F (37.1 °C) -- -- -- -- -- -- AB            Physical Exam  HENT:      Head: Normocephalic and atraumatic.   Cardiovascular:      Rate and Rhythm: Normal rate and regular rhythm.   Pulmonary:      Effort: Pulmonary effort is normal.      Breath sounds: Normal breath sounds.   Abdominal:      Palpations: Abdomen is soft.      Tenderness: There is no abdominal tenderness. There is no guarding or rebound. Negative signs include Padron's sign.   Skin:     General: Skin is warm.      Capillary Refill: Capillary refill takes less than 2 seconds.   Neurological:      General: No focal deficit present.      Mental Status: He is alert and oriented to person, place, and time.         Results Reviewed       Procedure Component Value Units Date/Time    UA w Reflex to Microscopic w Reflex to Culture [[id number]] Collected: 02/11/25 1633    Lab Status: Final result Specimen: Urine, Clean Catch Updated: 02/11/25 1708     Color, UA Yellow     Clarity, UA Clear     Specific Gravity, UA 1.025     pH, UA 5.5     Leukocytes, UA Negative     Nitrite, UA Negative     Protein, UA Negative mg/dl      Glucose, UA Negative mg/dl      Ketones, UA Negative mg/dl      Urobilinogen, UA <2.0 mg/dl      Bilirubin, UA Negative     Occult Blood, UA Negative    RBC  Morphology Reflex Test [[id number]] Collected: 02/11/25 1135    Lab Status: Final result Specimen: Blood from Arm, Right Updated: 02/11/25 1401    CBC and differential [[id number]]  (Abnormal) Collected: 02/11/25 1135    Lab Status: Final result Specimen: Blood from Arm, Right Updated: 02/11/25 1307     WBC 16.22 Thousand/uL      RBC 5.45 Million/uL      Hemoglobin 15.9 g/dL      Hematocrit 48.0 %      MCV 88 fL      MCH 29.2 pg      MCHC 33.1 g/dL      RDW 12.7 %      MPV 10.5 fL      Platelets 315 Thousands/uL     Narrative:      This is an appended report.  These results have been appended to a previously verified report.    Manual Differential(PHLEBS Do Not Order) [[id number]]  (Abnormal) Collected: 02/11/25 1135    Lab Status: Final result Specimen: Blood from Arm, Right Updated: 02/11/25 1307     Segmented % 45 %      Bands % 3 %      Lymphocytes % 21 %      Monocytes % 4 %      Eosinophils % 21 %      Basophils % 3 %      Atypical Lymphocytes % 3 %      Absolute Neutrophils 7.79 Thousand/uL      Absolute Lymphocytes 3.89 Thousand/uL      Absolute Monocytes 0.65 Thousand/uL      Absolute Eosinophils 3.41 Thousand/uL      Absolute Basophils 0.49 Thousand/uL      Total Counted --     RBC Morphology Present     Platelet Estimate Adequate     Large Platelet Present    Comprehensive metabolic panel [[id number]]  (Abnormal) Collected: 02/11/25 1135    Lab Status: Final result Specimen: Blood from Arm, Right Updated: 02/11/25 1217     Sodium 142 mmol/L      Potassium 3.9 mmol/L      Chloride 108 mmol/L      CO2 27 mmol/L      ANION GAP 7 mmol/L      BUN 15 mg/dL      Creatinine 0.80 mg/dL      Glucose 105 mg/dL      Calcium 9.7 mg/dL      AST 22 U/L      ALT 24 U/L      Alkaline Phosphatase 106 U/L      Total Protein 7.5 g/dL      Albumin 4.4 g/dL      Total Bilirubin 0.82 mg/dL      eGFR 115 ml/min/1.73sq m     Narrative:      [employer] guidelines for Chronic Kidney Disease (CKD):     Stage 1  with normal or high GFR (GFR > 90 mL/min/1.73 square meters)    Stage 2 Mild CKD (GFR = 60-89 mL/min/1.73 square meters)    Stage 3A Moderate CKD (GFR = 45-59 mL/min/1.73 square meters)    Stage 3B Moderate CKD (GFR = 30-44 mL/min/1.73 square meters)    Stage 4 Severe CKD (GFR = 15-29 mL/min/1.73 square meters)    Stage 5 End Stage CKD (GFR <15 mL/min/1.73 square meters)  Note: GFR calculation is accurate only with a steady state creatinine    Lipase [[id number]]  (Normal) Collected: 02/11/25 1135    Lab Status: Final result Specimen: Blood from Arm, Right Updated: 02/11/25 1217     Lipase 43 u/L             CT abdomen pelvis with contrast   ED Interpretation by Corazon Villar MD (02/11 1915)   FINDINGS:     ABDOMEN     LOWER CHEST: No clinically significant abnormality in the visualized lower chest.     LIVER/BILIARY TREE: Enlarged fatty liver.     GALLBLADDER: Post cholecystectomy.     SPLEEN: Unremarkable.     PANCREAS: Unremarkable.     ADRENAL GLANDS: Unremarkable.     KIDNEYS/URETERS: Unremarkable. No hydronephrosis.     STOMACH AND BOWEL: Unremarkable.     APPENDIX: Normal.     ABDOMINOPELVIC CAVITY: No ascites. No pneumoperitoneum. No lymphadenopathy.     VESSELS: Unremarkable for patient's age.     PELVIS     REPRODUCTIVE ORGANS: Unremarkable for patient's age.     URINARY BLADDER: Unremarkable.     ABDOMINAL WALL/INGUINAL REGIONS: Unremarkable.     BONES: Transitional lumbosacral anatomy. Chronic L5 pars defects with grade 1 anterolisthesis likely L5 on S1.     IMPRESSION:     No acute findings with a normal appendix.        Workstation performed: [id number]        Final Interpretation by Marvel Cox MD (02/11 1907)      No acute findings with a normal appendix.         Workstation performed: [id number]             Procedures    ED Medication and Procedure Management   Prior to Admission Medications   Prescriptions Last Dose Informant Patient Reported? Taking?   amLODIPine-benazepril (LOTREL 5-10) 5-10 MG per  capsule  Self Yes No   Sig: Take 1 capsule by mouth daily   meloxicam (Mobic) 7.5 mg tablet  Self No No   Sig: Take 1 tablet (7.5 mg total) by mouth daily   metoprolol succinate (TOPROL-XL) 25 mg 24 hr tablet  Self Yes No   Sig: Take 25 mg by mouth daily   Patient not taking: Reported on 5/31/2024   metoprolol tartrate (LOPRESSOR) 25 mg tablet  Self Yes No   Sig: Take 25 mg by mouth 2 (two) times a day      Facility-Administered Medications: None     Discharge Medication List as of 2/11/2025  7:19 PM        CONTINUE these medications which have NOT CHANGED    Details   amLODIPine-benazepril (LOTREL 5-10) 5-10 MG per capsule Take 1 capsule by mouth daily, Starting Sat 4/27/2024, Historical Med      meloxicam (Mobic) 7.5 mg tablet Take 1 tablet (7.5 mg total) by mouth daily, Starting Thu 5/2/2024, Normal      metoprolol succinate (TOPROL-XL) 25 mg 24 hr tablet Take 25 mg by mouth daily, Historical Med      metoprolol tartrate (LOPRESSOR) 25 mg tablet Take 25 mg by mouth 2 (two) times a day, Starting Fri 3/15/2024, Historical Med           No discharge procedures on file.  ED SEPSIS DOCUMENTATION   Time reflects when diagnosis was documented in both MDM as applicable and the Disposition within this note       Time User Action Codes Description Comment    2/11/2025  7:16 PM Corazon Villar Add [R10.9] Abdominal pain                  Corazon Villar MD  02/12/25 0115